# Patient Record
Sex: MALE | Race: BLACK OR AFRICAN AMERICAN | URBAN - METROPOLITAN AREA
[De-identification: names, ages, dates, MRNs, and addresses within clinical notes are randomized per-mention and may not be internally consistent; named-entity substitution may affect disease eponyms.]

---

## 2019-05-11 ENCOUNTER — HOSPITAL ENCOUNTER (EMERGENCY)
Facility: CLINIC | Age: 29
Discharge: HOME | End: 2019-05-11
Attending: EMERGENCY MEDICINE | Admitting: EMERGENCY MEDICINE
Payer: COMMERCIAL

## 2019-05-11 VITALS
TEMPERATURE: 97.9 F | DIASTOLIC BLOOD PRESSURE: 88 MMHG | OXYGEN SATURATION: 100 % | SYSTOLIC BLOOD PRESSURE: 149 MMHG | RESPIRATION RATE: 16 BRPM

## 2019-05-11 DIAGNOSIS — S80.00XA CONTUSION OF KNEE, UNSPECIFIED LATERALITY, INITIAL ENCOUNTER: ICD-10-CM

## 2019-05-11 DIAGNOSIS — S01.81XA LACERATION OF FOREHEAD, INITIAL ENCOUNTER: ICD-10-CM
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
## 2019-05-11 PROCEDURE — 12011 RPR F/E/E/N/L/M 2.5 CM/<: CPT | Mod: Z6 | Performed by: EMERGENCY MEDICINE

## 2019-05-11 PROCEDURE — 99282 EMERGENCY DEPT VISIT SF MDM: CPT | Mod: 25 | Performed by: EMERGENCY MEDICINE

## 2019-05-11 PROCEDURE — 99283 EMERGENCY DEPT VISIT LOW MDM: CPT | Mod: 25 | Performed by: EMERGENCY MEDICINE

## 2019-05-11 PROCEDURE — 12011 RPR F/E/E/N/L/M 2.5 CM/<: CPT | Performed by: EMERGENCY MEDICINE

## 2019-05-11 NOTE — ED AVS SNAPSHOT
[facility] Emergency Department  [address] [address]  Phone:  [phone]  Fax:  [phone]                                    Higinio Sequeira   MRN: [id number]    Department:  [facility] Emergency Department   Date of Visit:  5/11/2019           After Visit Summary Signature Page    I have received my discharge instructions, and my questions have been answered. I have discussed any challenges I see with this plan with the nurse or doctor.    ..........................................................................................................................................  Patient/Patient Representative Signature      ..........................................................................................................................................  Patient Representative Print Name and Relationship to Patient    ..................................................               ................................................  Date                                   Time    ..........................................................................................................................................  Reviewed by Signature/Title    ...................................................              ..............................................  Date                                               Time          22EPIC Rev 08/18

## 2019-05-11 NOTE — ED PROVIDER NOTES
History     Chief Complaint   Patient presents with     Facial Injury     Fell off the bunk (approx 5 ft)     Knee Injury     bilat- Right worse than Left     HPI  Higinio Sequeira is a 28 year old male inmate present for evaluation of injury sustained after falling off of his bunk tonight.  Patient was reportedly sleeping and woke after rolling out of bed.  Patient fell striking his forehead and knees on the ground.  Patient reports mild frontal headache but no dizziness, vision changes, neck pain, or back pain.  Patient reports some pain to both knees but has been ambulatory without significant difficulty.  Denies any blurry vision or double vision.  Denies any other recent symptoms and no recent history of head injury.    Allergies:  No Known Allergies    Problem List:    There are no active problems to display for this patient.       Past Medical History:    No past medical history on file.    Past Surgical History:    Past Surgical History:   Procedure Laterality Date     LAPAROSCOPIC HERNIORRHAPHY INGUINAL Left 6/7/2016    Procedure: LAPAROSCOPIC HERNIORRHAPHY INGUINAL;  Surgeon: Tuan Lopes MD;  Location: [facility] OR       Family History:    No family history on file.    Social History:  Marital Status:  Single [1]  Social History     Tobacco Use     Smoking status: Former Smoker   Substance Use Topics     Alcohol use: No     Drug use: Not on file        Medications:      mirtazapine (REMERON) 45 MG tablet   HYDROcodone-acetaminophen (NORCO) 5-325 MG per tablet   POLYETHYLENE GLYCOL 3350 PO   PRAZOSIN HCL PO         Review of Systems   Constitutional: Negative for fever.   HENT: Negative for congestion.         Frontal head pain with laceration   Eyes: Negative for visual disturbance.   Respiratory: Negative for cough, chest tightness and shortness of breath.    Cardiovascular: Negative for chest pain.   Gastrointestinal: Negative for abdominal pain.   Musculoskeletal: Negative for back pain and neck  pain.   Skin: Positive for wound.   Neurological: Positive for headaches. Negative for dizziness, seizures, syncope, speech difficulty, weakness, light-headedness and numbness.   Psychiatric/Behavioral: Negative for confusion.   All other systems reviewed and are negative.      Physical Exam   BP: 149/88  Heart Rate: 71  Temp: 97.9  F (36.6  C)  Resp: 16  SpO2: 100 %      Physical Exam   Constitutional: He is oriented to person, place, and time. He appears well-developed and well-nourished. No distress.   HENT:   Head: Normocephalic. Head is with laceration.       Nose: No sinus tenderness or nasal deformity.   No facial bony tenderness or instability   Eyes: Pupils are equal, round, and reactive to light. Conjunctivae and EOM are normal.   Cardiovascular: Normal rate.   Pulmonary/Chest: Effort normal.   Abdominal: Soft.   Musculoskeletal: Normal range of motion.   Moves all extremities equally   Neurological: He is alert and oriented to person, place, and time. No sensory deficit.   Skin: Skin is warm. Capillary refill takes less than 2 seconds. He is not diaphoretic.   Psychiatric: He has a normal mood and affect.   Nursing note and vitals reviewed.                  ED Course        Laceration repair  Date/Time: 5/11/2019 5:46 AM  Performed by: Nicholas Whitlock MD  Authorized by: Nicholas Whitlock MD   Consent: Verbal consent obtained.  Risks and benefits: risks, benefits and alternatives were discussed  Consent given by: patient  Body area: head/neck (bridge of nose/lower forehead, see photo)  Laceration length: 2 cm  Foreign bodies: no foreign bodies  Tendon involvement: none  Nerve involvement: none  Vascular damage: no  Anesthesia: local infiltration    Anesthesia:  Local Anesthetic: lidocaine 1% without epinephrine  Anesthetic total: 1.5 mL    Sedation:  Patient sedated: no    Preparation: Patient was prepped and draped in the usual sterile fashion.  Amount of cleaning: standard  Debridement:  none  Degree of undermining: none  Skin closure: 6-0 nylon  Number of sutures: 6  Technique: simple  Approximation: close  Approximation difficulty: simple  Dressing: antibiotic ointment  Patient tolerance: Patient tolerated the procedure well with no immediate complications                         No results found for this or any previous visit (from the past 24 hour(s)).    Medications - No data to display    Assessments & Plan (with Medical Decision Making)  28-year-old male with no significant contributing past medical history presenting for evaluation of head injury after a fall out of his bunk.  Patient does not recall the incident however he was sleeping and subsequently apparently rolled out of bed.  He struck his head and had immediate bleeding.  Head wound was washed and he was brought in for evaluation.  No neurologic symptoms.  No evidence of seizure.  No neck pain.  He does have some contusions to both kneecaps but no pain elsewhere in the knees or extremities.  Facial laceration repaired as above.  Advised of need for suture removal in 1 week.  Discharged home in improved condition     I have reviewed the nursing notes.    I have reviewed the findings, diagnosis, plan and need for follow up with the patient.          Medication List      There are no discharge medications for this visit.         Final diagnoses:   Fall, initial encounter   Laceration of forehead, initial encounter   Contusion of knee, unspecified laterality, initial encounter - bilateral       5/11/2019   [facility] EMERGENCY DEPARTMENT     Whitlock, Nicholas Dickerson MD  05/12/19 [id number]

## 2019-05-12 ASSESSMENT — ENCOUNTER SYMPTOMS
CHEST TIGHTNESS: 0
SEIZURES: 0
CONFUSION: 0
WOUND: 1
FEVER: 0
WEAKNESS: 0
COUGH: 0
LIGHT-HEADEDNESS: 0
SPEECH DIFFICULTY: 0
NECK PAIN: 0
ABDOMINAL PAIN: 0
NUMBNESS: 0
HEADACHES: 1
SHORTNESS OF BREATH: 0
BACK PAIN: 0
DIZZINESS: 0